# Patient Record
Sex: MALE | Race: WHITE | Employment: FULL TIME | URBAN - NONMETROPOLITAN AREA
[De-identification: names, ages, dates, MRNs, and addresses within clinical notes are randomized per-mention and may not be internally consistent; named-entity substitution may affect disease eponyms.]

---

## 2021-05-20 ENCOUNTER — HOSPITAL ENCOUNTER (OUTPATIENT)
Age: 66
Discharge: HOME | End: 2021-05-20
Payer: OTHER GOVERNMENT

## 2021-05-20 DIAGNOSIS — R31.0 GROSS HEMATURIA: Primary | ICD-10-CM

## 2021-05-20 DIAGNOSIS — R31.0 GROSS HEMATURIA: ICD-10-CM

## 2021-05-20 LAB
BILIRUBIN URINE: NEGATIVE
BLOOD, URINE: NEGATIVE
CHARACTER, URINE: CLEAR
COLOR: YELLOW
GLUCOSE, URINE: NEGATIVE MG/DL
KETONES, URINE: NEGATIVE
LEUKOCYTE EST, POC: NEGATIVE
NITRITE, URINE: NEGATIVE
PH UA: 6.5 (ref 5–9)
PROTEIN UA: NEGATIVE MG/DL
SPECIFIC GRAVITY UA: 1.02 (ref 1–1.03)
UROBILINOGEN, URINE: 1 EU/DL (ref 0–1)

## 2021-05-20 PROCEDURE — 81003 URINALYSIS AUTO W/O SCOPE: CPT

## 2021-07-06 ENCOUNTER — HOSPITAL ENCOUNTER (OUTPATIENT)
Age: 66
Discharge: HOME | End: 2021-07-06

## 2021-07-06 LAB — RUBELLA: 118.8 IU/ML

## 2021-07-09 LAB
MUMPS IGG TITER: 4.27
VZV IGG SER QL IA: 2.99

## 2021-07-10 LAB — RUBEOLA IGG: > 300 AU/ML

## 2021-09-14 ENCOUNTER — HOSPITAL ENCOUNTER (OUTPATIENT)
Age: 66
Discharge: HOME | End: 2021-09-14
Payer: OTHER GOVERNMENT

## 2021-09-14 LAB
ALBUMIN SERPL-MCNC: 4.8 G/DL (ref 3.5–5.1)
ALP BLD-CCNC: 72 U/L (ref 38–126)
ALT SERPL-CCNC: 25 U/L (ref 11–66)
ANION GAP SERPL CALCULATED.3IONS-SCNC: 10 MEQ/L (ref 8–16)
AST SERPL-CCNC: 21 U/L (ref 5–40)
BASOPHILS # BLD: 0.7 %
BASOPHILS ABSOLUTE: 0 THOU/MM3 (ref 0–0.1)
BILIRUB SERPL-MCNC: 0.5 MG/DL (ref 0.3–1.2)
BUN BLDV-MCNC: 23 MG/DL (ref 7–22)
CALCIUM SERPL-MCNC: 9.7 MG/DL (ref 8.5–10.5)
CHLORIDE BLD-SCNC: 104 MEQ/L (ref 98–111)
CHOLESTEROL, TOTAL: 167 MG/DL (ref 100–199)
CO2: 28 MEQ/L (ref 23–33)
CREAT SERPL-MCNC: 0.8 MG/DL (ref 0.4–1.2)
EOSINOPHIL # BLD: 1.5 %
EOSINOPHILS ABSOLUTE: 0.1 THOU/MM3 (ref 0–0.4)
ERYTHROCYTE [DISTWIDTH] IN BLOOD BY AUTOMATED COUNT: 13.2 % (ref 11.5–14.5)
ERYTHROCYTE [DISTWIDTH] IN BLOOD BY AUTOMATED COUNT: 45.4 FL (ref 35–45)
GFR SERPL CREATININE-BSD FRML MDRD: > 90 ML/MIN/1.73M2
GLUCOSE BLD-MCNC: 106 MG/DL (ref 70–108)
HCT VFR BLD CALC: 51.4 % (ref 42–52)
HDLC SERPL-MCNC: 57 MG/DL
HEMOGLOBIN: 17 GM/DL (ref 14–18)
IMMATURE GRANS (ABS): 0.01 THOU/MM3 (ref 0–0.07)
IMMATURE GRANULOCYTES: 0.2 %
LDL CHOLESTEROL CALCULATED: 92 MG/DL
LYMPHOCYTES # BLD: 32.7 %
LYMPHOCYTES ABSOLUTE: 1.5 THOU/MM3 (ref 1–4.8)
MCH RBC QN AUTO: 31 PG (ref 26–33)
MCHC RBC AUTO-ENTMCNC: 33.1 GM/DL (ref 32.2–35.5)
MCV RBC AUTO: 93.8 FL (ref 80–94)
MONOCYTES # BLD: 8.8 %
MONOCYTES ABSOLUTE: 0.4 THOU/MM3 (ref 0.4–1.3)
NUCLEATED RED BLOOD CELLS: 0 /100 WBC
PLATELET # BLD: 172 THOU/MM3 (ref 130–400)
PMV BLD AUTO: 10.5 FL (ref 9.4–12.4)
POTASSIUM SERPL-SCNC: 4.7 MEQ/L (ref 3.5–5.2)
PROSTATE SPECIFIC ANTIGEN: 1.48 NG/ML (ref 0–1)
RBC # BLD: 5.48 MILL/MM3 (ref 4.7–6.1)
SEG NEUTROPHILS: 56.1 %
SEGMENTED NEUTROPHILS ABSOLUTE COUNT: 2.5 THOU/MM3 (ref 1.8–7.7)
SODIUM BLD-SCNC: 142 MEQ/L (ref 135–145)
TOTAL PROTEIN: 7 G/DL (ref 6.1–8)
TRIGL SERPL-MCNC: 89 MG/DL (ref 0–199)
TSH SERPL DL<=0.05 MIU/L-ACNC: 1.63 UIU/ML (ref 0.4–4.2)
WBC # BLD: 4.5 THOU/MM3 (ref 4.8–10.8)

## 2021-09-14 PROCEDURE — 84153 ASSAY OF PSA TOTAL: CPT

## 2021-09-14 PROCEDURE — 84443 ASSAY THYROID STIM HORMONE: CPT

## 2021-09-14 PROCEDURE — 80053 COMPREHEN METABOLIC PANEL: CPT

## 2021-09-14 PROCEDURE — 80061 LIPID PANEL: CPT

## 2021-09-14 PROCEDURE — 36415 COLL VENOUS BLD VENIPUNCTURE: CPT

## 2021-09-14 PROCEDURE — 85025 COMPLETE CBC W/AUTO DIFF WBC: CPT

## 2022-11-17 ENCOUNTER — HOSPITAL ENCOUNTER (OUTPATIENT)
Age: 67
Discharge: HOME | End: 2022-11-17

## 2023-04-20 ENCOUNTER — NURSE ONLY (OUTPATIENT)
Dept: LAB | Age: 68
End: 2023-04-20

## 2023-04-20 ENCOUNTER — OFFICE VISIT (OUTPATIENT)
Dept: PULMONOLOGY | Age: 68
End: 2023-04-20
Payer: MEDICARE

## 2023-04-20 VITALS
TEMPERATURE: 98.2 F | SYSTOLIC BLOOD PRESSURE: 122 MMHG | BODY MASS INDEX: 26.81 KG/M2 | HEIGHT: 69 IN | DIASTOLIC BLOOD PRESSURE: 72 MMHG | HEART RATE: 74 BPM | OXYGEN SATURATION: 95 % | WEIGHT: 181 LBS

## 2023-04-20 DIAGNOSIS — G47.10 HYPERSOMNIA: ICD-10-CM

## 2023-04-20 DIAGNOSIS — R06.83 SNORING: ICD-10-CM

## 2023-04-20 DIAGNOSIS — F32.A DEPRESSION, UNSPECIFIED DEPRESSION TYPE: ICD-10-CM

## 2023-04-20 DIAGNOSIS — R06.81 APNEA: ICD-10-CM

## 2023-04-20 DIAGNOSIS — G47.10 HYPERSOMNIA: Primary | ICD-10-CM

## 2023-04-20 DIAGNOSIS — G47.30 SLEEP APNEA, UNSPECIFIED TYPE: ICD-10-CM

## 2023-04-20 DIAGNOSIS — R53.83 TIREDNESS: ICD-10-CM

## 2023-04-20 LAB
ANION GAP SERPL CALC-SCNC: 9 MEQ/L (ref 8–16)
BASOPHILS ABSOLUTE: 0 THOU/MM3 (ref 0–0.1)
BASOPHILS NFR BLD AUTO: 0.6 %
BUN SERPL-MCNC: 24 MG/DL (ref 7–22)
CALCIUM SERPL-MCNC: 9.6 MG/DL (ref 8.5–10.5)
CHLORIDE SERPL-SCNC: 105 MEQ/L (ref 98–111)
CO2 SERPL-SCNC: 27 MEQ/L (ref 23–33)
CREAT SERPL-MCNC: 1 MG/DL (ref 0.4–1.2)
DEPRECATED RDW RBC AUTO: 46.3 FL (ref 35–45)
EOSINOPHIL NFR BLD AUTO: 1.5 %
EOSINOPHILS ABSOLUTE: 0.1 THOU/MM3 (ref 0–0.4)
ERYTHROCYTE [DISTWIDTH] IN BLOOD BY AUTOMATED COUNT: 13.5 % (ref 11.5–14.5)
GFR SERPL CREATININE-BSD FRML MDRD: > 60 ML/MIN/1.73M2
GLUCOSE SERPL-MCNC: 107 MG/DL (ref 70–108)
HCT VFR BLD AUTO: 48.3 % (ref 42–52)
HGB BLD-MCNC: 16 GM/DL (ref 14–18)
IMM GRANULOCYTES # BLD AUTO: 0.01 THOU/MM3 (ref 0–0.07)
IMM GRANULOCYTES NFR BLD AUTO: 0.2 %
LYMPHOCYTES ABSOLUTE: 1.8 THOU/MM3 (ref 1–4.8)
LYMPHOCYTES NFR BLD AUTO: 34.3 %
MCH RBC QN AUTO: 30.8 PG (ref 26–33)
MCHC RBC AUTO-ENTMCNC: 33.1 GM/DL (ref 32.2–35.5)
MCV RBC AUTO: 92.9 FL (ref 80–94)
MONOCYTES ABSOLUTE: 0.4 THOU/MM3 (ref 0.4–1.3)
MONOCYTES NFR BLD AUTO: 8.3 %
NEUTROPHILS NFR BLD AUTO: 55.1 %
NRBC BLD AUTO-RTO: 0 /100 WBC
PLATELET # BLD AUTO: 205 THOU/MM3 (ref 130–400)
PMV BLD AUTO: 10.7 FL (ref 9.4–12.4)
POTASSIUM SERPL-SCNC: 4.4 MEQ/L (ref 3.5–5.2)
RBC # BLD AUTO: 5.2 MILL/MM3 (ref 4.7–6.1)
SEGMENTED NEUTROPHILS ABSOLUTE COUNT: 2.9 THOU/MM3 (ref 1.8–7.7)
SODIUM SERPL-SCNC: 141 MEQ/L (ref 135–145)
TSH SERPL DL<=0.005 MIU/L-ACNC: 2.02 UIU/ML (ref 0.4–4.2)
WBC # BLD AUTO: 5.3 THOU/MM3 (ref 4.8–10.8)

## 2023-04-20 PROCEDURE — G8419 CALC BMI OUT NRM PARAM NOF/U: HCPCS | Performed by: INTERNAL MEDICINE

## 2023-04-20 PROCEDURE — 1123F ACP DISCUSS/DSCN MKR DOCD: CPT | Performed by: INTERNAL MEDICINE

## 2023-04-20 PROCEDURE — G8427 DOCREV CUR MEDS BY ELIG CLIN: HCPCS | Performed by: INTERNAL MEDICINE

## 2023-04-20 PROCEDURE — 1036F TOBACCO NON-USER: CPT | Performed by: INTERNAL MEDICINE

## 2023-04-20 PROCEDURE — 99203 OFFICE O/P NEW LOW 30 MIN: CPT | Performed by: INTERNAL MEDICINE

## 2023-04-20 PROCEDURE — 3017F COLORECTAL CA SCREEN DOC REV: CPT | Performed by: INTERNAL MEDICINE

## 2023-04-20 RX ORDER — CITALOPRAM 20 MG/1
20 TABLET ORAL DAILY
COMMUNITY

## 2023-04-20 RX ORDER — ASPIRIN 81 MG/1
81 TABLET ORAL DAILY
COMMUNITY

## 2023-04-20 RX ORDER — ATORVASTATIN CALCIUM 40 MG/1
40 TABLET, FILM COATED ORAL DAILY
COMMUNITY

## 2023-04-20 RX ORDER — CLONAZEPAM 0.5 MG/1
0.5 TABLET ORAL 2 TIMES DAILY PRN
COMMUNITY

## 2023-04-20 NOTE — PROGRESS NOTES
[facility] and [address] initial consultation note    Kinjal Singletary                                                Chief complaint: Kinjal Singletary is a 74 y. o.oldmale came for further evaluation regarding his ?sleep apnea  with referral from self/ Dr. Landy Everett in [facility]. Ramona:    Sleep/Wake schedule:  Usual time to go to bed during the work/regular day of week: 9:45 PM  Usual time to wake up during the work//regular day of week: 6:15 AM  Over the weekends his sleep schedule:  [x]phase delayed. He feels the same on the weekends despite sleeping long time. He usually falls a sleep in less than: 20 minutes. He can go to sleep within 20 minutes even without taking Klonopin. He is taking Klonopin 0.25 mg p.o. nightly for sleep maintenance insomnia. He receives his prescription from his family physician. He used to take melatonin 10 mg p.o. nightly in the past for his sleep maintenance insomnia with no significant improvement. Patient quit taking melatonin. He takes naps: Yes. Number of naps per week: 2 Times, especially on the weekends  During each nap he spends a total of: 10 minutes to 60 minutes  The naps were reported as refreshing: Yes. Sleep Hygiene:  Is the temperature and evironment in his bed room is acceptable to him: Yes. He watches Television in his bed room: No.   He read books, study, pay bills etc in the bed: No.   Frequency He wake up during night/sleep: 1 time. He used to wake up up to 2 times during nighttime in the past.  He was started on Cardura. After starting on Cardura he is waking up 1 time at nighttime to go to restroom. Majority of nocturnal awakenings are for urination: Yes. Difficulty in falling back to sleep after nocturnal awakenings: No  .  Do you drink coffee: Yes. He drinks 1 to 2 cups of coffee per day. He drinks his second cup of coffee before noon.       Do you drink

## 2023-04-20 NOTE — PROGRESS NOTES
Chief Complaint: New sleep consult, self referral No priors, no echos. Mallampati airway Class:2  Neck Circumference: 17 Inches    Moore sleepiness score 4/20/23: {NUMBERS [id number]:[id number]}.

## 2023-04-20 NOTE — PATIENT INSTRUCTIONS
Recommendations/Plan:  - Schedule patient for nocturnal polysomnogram (Sleep study) with split night protocol at [facility] to diagnose sleep apnea and to get optimal pressure I.e CPAP or BiPAP to start/continue PAP therapy. Patient to follow with my clinic at [facility] sleep [facility] in 6 to 8 weeks with CPAP download for further evaluation.  -Patient CBC, BMP ,free T4 and TSH. He was advised to call my office in a.m. to go over the lab results. He was also advised to check the labs in [address]  -He was advised to use Melatonin 3 mg p.o. nightly from over-the-counter.   -He was advised to taper and discontinue Klonopin.  -I had a discussion with patient regarding avialable treatment options for his sleep disorder breathing including but not limited to CPAP titration in the sleep lab Vs.Dental appliance placement with referral to a local dentist Vs other available surgical options including Uvulopalatopharyngoplasty, maxillomandibular ostomy, Inspire device placement and tracheostomy as last option. At the end of discussion, he decided on CPAP/BiPAP/AutoSV as a treatment if he found to have obstructive sleep apnea during above test/study.  -He was educated to practice good sleep hygiene practices. He was provided with a good sleep hygiene hand out.  -Gabriela Salgado was advised to make earlier appointment with my clinic if he develops any worsening of sleep symptoms. He verbalizes understanding.  -Gabriela Salgado was advised to not to drive any motor vehicles or operate heavy equipment until his sleep symptoms are under good control. Kevin Leon verbalizes understanding.  -Kevin Leon was educated about my impression and plan. He verbalizes understanding.

## 2023-04-24 ENCOUNTER — HOSPITAL ENCOUNTER (OUTPATIENT)
Dept: SLEEP CENTER | Age: 68
Discharge: HOME | End: 2023-04-26
Payer: MEDICARE

## 2023-04-24 DIAGNOSIS — R53.83 TIREDNESS: ICD-10-CM

## 2023-04-24 DIAGNOSIS — R06.83 SNORING: ICD-10-CM

## 2023-04-24 DIAGNOSIS — G47.30 SLEEP APNEA, UNSPECIFIED TYPE: ICD-10-CM

## 2023-04-24 DIAGNOSIS — R06.81 APNEA: ICD-10-CM

## 2023-04-24 DIAGNOSIS — F32.A DEPRESSION, UNSPECIFIED DEPRESSION TYPE: ICD-10-CM

## 2023-04-24 DIAGNOSIS — G47.10 HYPERSOMNIA: ICD-10-CM

## 2023-04-24 PROCEDURE — 95810 POLYSOM 6/> YRS 4/> PARAM: CPT

## 2023-04-25 DIAGNOSIS — F32.A DEPRESSION, UNSPECIFIED DEPRESSION TYPE: ICD-10-CM

## 2023-04-25 DIAGNOSIS — G47.10 HYPERSOMNIA: Primary | ICD-10-CM

## 2023-04-25 DIAGNOSIS — G47.33 OSA (OBSTRUCTIVE SLEEP APNEA): ICD-10-CM

## 2023-04-25 DIAGNOSIS — R53.83 TIREDNESS: ICD-10-CM

## 2023-04-25 LAB — STATUS: NORMAL

## 2023-04-26 NOTE — PROGRESS NOTES
[address] [place] [address]                               SLEEP STUDY REPORT    PATIENT NAME: Vivek Desouza                     :        1955  MED REC NO:   [id number]                           ROOM:  ACCOUNT NO:   [de-identified]                           ADMIT DATE: 2023  PROVIDER:     Chela Guerrero. MD Salome    DATE OF STUDY:  2023    REFERRING PROVIDER:  Jose Maria Arango MD    The patient's height is 69 inches, weight is 181 pounds with a BMI of  26.7. HISTORY:  The patient is a 44-year-old pleasant gentleman who was  initially evaluated by me on 2023. The patient is currently  suffering with hypersomnia. The patient also gave a history of snoring  and witnessed apneas. The patient having frequent nocturnal awakenings. The patient had associated comorbidities including depression. The  patient underwent tonsillectomy in the past.  The patient is scheduled  for split-night sleep study to diagnose and treat sleep apnea. The  patient however did not qualify for split-night sleep study and  underwent only baseline sleep study due to insufficient respiratory  events in the first 2 hours of the sleep study to qualify patient for  split-night sleep study. METHODS:  The patient underwent digital polysomnography in compliance  with the standards and specifications from the AASM Manual including the  simultaneous recording of 3 EEG channels (F4-M1, C4-M1, and O2-M1 with  back up electrodes F3-M2, C3-M2, and O1-M2), 2 EOG channels (E1-M2, and  E2-M1,), EMG (chin, left & right leg), EKG, Nonin pulse oximetry with   less than 2 second averaging time, body position, airflow recorded by  oral-nasal thermal sensor and nasal air pressure transducer, plus  respiratory effort recorded by calibrated respiratory inductance  plethysmography (RIP), flow volume loop, sound and video.   Sleep staging  and scoring

## 2023-04-27 ENCOUNTER — TELEPHONE (OUTPATIENT)
Dept: PULMONOLOGY | Age: 68
End: 2023-04-27

## 2023-04-27 NOTE — TELEPHONE ENCOUNTER
Called Pt to verify which DME he wanted.  Sent CPAP order to [facility] per Pts request.

## 2023-04-27 NOTE — TELEPHONE ENCOUNTER
----- Message from Corey Felton MD sent at 4/27/2023  9:38 AM EDT -----  Regarding: Need to fax the order to DME  -Will start patient on Auto CPAP machine therapy with a Minimum CPAP of 6cm H20 and Maximum CPAP of 20cm H20. He need to be followed closely with down load by via680 company in 4 weeks. He was instructed to make earlier appointment with my sleep clinic if he had any difficulty in using his auto CPAP machine therapy to consider for in lab CPAP titration.   -He need to come for follow up with my clinic in 2months to 10 weeks with a CPAP down load for clinical reevaluation and management.     Thanks

## 2023-04-28 NOTE — TELEPHONE ENCOUNTER
Hussain from [facility] called and states that [facility] is out of their service area so they are unable to process the order for a rental machine. She states that he will need to choose a DME that is closer to where he lives in [facility]. She is uncertain on what DME companies are available to him. Called patient and LVM explaining the situation. Advised patient via voicemail to call office back once he has chosen a DME company so that we may fax the order.

## 2023-05-04 ENCOUNTER — TELEPHONE (OUTPATIENT)
Dept: PULMONOLOGY | Age: 68
End: 2023-05-04

## 2023-07-19 ENCOUNTER — OFFICE VISIT (OUTPATIENT)
Dept: PULMONOLOGY | Age: 68
End: 2023-07-19
Payer: MEDICARE

## 2023-07-19 VITALS
HEART RATE: 69 BPM | TEMPERATURE: 97.7 F | BODY MASS INDEX: 27.25 KG/M2 | DIASTOLIC BLOOD PRESSURE: 70 MMHG | OXYGEN SATURATION: 98 % | WEIGHT: 184 LBS | HEIGHT: 69 IN | SYSTOLIC BLOOD PRESSURE: 118 MMHG

## 2023-07-19 DIAGNOSIS — G47.33 OSA ON CPAP: Primary | ICD-10-CM

## 2023-07-19 DIAGNOSIS — Z99.89 OSA ON CPAP: Primary | ICD-10-CM

## 2023-07-19 DIAGNOSIS — G47.10 HYPERSOMNIA: ICD-10-CM

## 2023-07-19 PROCEDURE — 3017F COLORECTAL CA SCREEN DOC REV: CPT | Performed by: INTERNAL MEDICINE

## 2023-07-19 PROCEDURE — 99213 OFFICE O/P EST LOW 20 MIN: CPT | Performed by: INTERNAL MEDICINE

## 2023-07-19 PROCEDURE — 1036F TOBACCO NON-USER: CPT | Performed by: INTERNAL MEDICINE

## 2023-07-19 PROCEDURE — G8427 DOCREV CUR MEDS BY ELIG CLIN: HCPCS | Performed by: INTERNAL MEDICINE

## 2023-07-19 PROCEDURE — 1123F ACP DISCUSS/DSCN MKR DOCD: CPT | Performed by: INTERNAL MEDICINE

## 2023-07-19 PROCEDURE — G8417 CALC BMI ABV UP PARAM F/U: HCPCS | Performed by: INTERNAL MEDICINE

## 2023-07-19 NOTE — PROGRESS NOTES
Chief Complaint: f/u after titration with download     Mallampati airway Class:3  Neck Circumference:.17 Inches    [place] sleepiness score 7/19/23: 6  Sleep apnea quality of life questionnaire:.50      Diagnostic Data:   PAP Download:   Recorded compliance dates:6/18/23-7/17/23  More than 4hour usage compliance was:33%. Average residual Apnea- Hypoapnea index on current pressue was:1.3.       PAP Type auto   Level  6/20     Average usuage hours per day was:.[id number]     Interface: nasal    Provider:  []-[facility]IVLA  [x]Anjel  []Tomas  []Melissaare         [][facility] []Other:

## 2023-07-19 NOTE — PATIENT INSTRUCTIONS
Recommendations/Plan:  -He was advised to submit his down load report from hisCPAP for next 1month starting from today to document his > 4hour compliance. He was informed about the possibility of loosing his CPAP if he don't use it with good compliance for >4hours i.e >70%. He verbalizes understanding.  -We will change his current auto CPAP pressure settings to a minimal CPAP pressure of 8 cm of water and max CPAP pressure of 12 cm of water and an EPR of 2.  -He will be referred to [facility]( [address] [place] [address],[facility]) company for mask refit with a different style mask for better compliance and comfort. He is interested in trying nasal pillows mask.  -Patient will be given a prescription for chin strap to use with his current mask to avoid leaks and to improve his dryness of mouth.  -He want to taper and discontinue Celexa. He told me that his depression is under good control.  -He is continuing Klonopin 0.25 mg p.o. nightly on as needed basis. -He was advised to take Klonopin only if he can keep good compliance to the CPAP therapy.  -He was also informed about the non-availability of safety data regarding simultaneous hypnotic therapy in patients with obstructive sleep apnea. He Verbalizes understanding. He agreed to go for therapy with hypnotic by taking the risks. He also informed about the need to for 100% compliance with CPAP therapy during above medication use. -Hewas detailed about the side effects of long term use of hypnotics including Klonopin. He agreed to take the risk. Hewas also advised to take Klonopin on PRN basis only with intermittent dosing. He was advised to not to drive any motor vehicles or operate heavy equipment after taking the above medication.  He verbalizes understanding.  -He was advised to make early appointment with my clinic if he develops any of the following conditions including -> COPD, CHF,Obesity hypoventilation syndrome, undergo palate surgery and Central sleep apnea his Auto

## 2023-09-18 NOTE — PROGRESS NOTES
[place] [facility] and [address] [facility] Follow up note    Acquanetta Officer                                             Chief complaint and [place]: Acquanetta Officer is a 79 y. o.oldmale came for follow up regarding his sleep apnea. He is currently using his positive airway pressure device with an Auto CPAP machine therapy with a Minimum CPAP of 8cm H20 and Maximum CPAP of 12cm H20. He denies any problems with machine. He is currently using a nasal pillow  mask. His CPAP machine is in his bedroom in [place], [place]. 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 He is tolerating his CPAP machine well. His residual apnea hypopneas is under control. He denies any excessive daytime sleepiness. 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 Review of Systems:   General/Constitutional: he had same weight from the last visit with normal appetite. No fever or chills. HENT: Negative. Eyes: Negative. Upper respiratory tract: No nasal stuffiness or post nasal drip. Lower respiratory tract/ lungs: No cough or sputum production. No hemoptysis. Cardiovascular: No palpitations or chest pain. Gastrointestinal: No nausea or vomiting. Neurological: No focal neurologiacal weakness. Extremities: No edema. Musculoskeletal: No complaints. Genitourinary: No complaints. Hematological: Negative. Psychiatric/Behavioral: Negative. Skin: No itching. No past medical history on file. No past surgical history on file.     Social History     Tobacco Use    Smoking status: Never     Passive exposure: Never    Smokeless tobacco: Never   Substance Use Topics    Alcohol use: Not Currently    Drug use: Never       No Known Allergies    Current Outpatient Medications   Medication Sig Dispense Refill

## 2023-10-18 ENCOUNTER — OFFICE VISIT (OUTPATIENT)
Dept: PULMONOLOGY | Age: 68
End: 2023-10-18
Payer: MEDICARE

## 2023-10-18 VITALS
OXYGEN SATURATION: 98 % | TEMPERATURE: 97.6 F | WEIGHT: 184.2 LBS | BODY MASS INDEX: 27.28 KG/M2 | SYSTOLIC BLOOD PRESSURE: 118 MMHG | DIASTOLIC BLOOD PRESSURE: 66 MMHG | HEART RATE: 78 BPM | HEIGHT: 69 IN

## 2023-10-18 DIAGNOSIS — G47.33 OSA ON CPAP: Primary | ICD-10-CM

## 2023-10-18 PROCEDURE — G8484 FLU IMMUNIZE NO ADMIN: HCPCS | Performed by: INTERNAL MEDICINE

## 2023-10-18 PROCEDURE — 99213 OFFICE O/P EST LOW 20 MIN: CPT | Performed by: INTERNAL MEDICINE

## 2023-10-18 PROCEDURE — 1036F TOBACCO NON-USER: CPT | Performed by: INTERNAL MEDICINE

## 2023-10-18 PROCEDURE — 1123F ACP DISCUSS/DSCN MKR DOCD: CPT | Performed by: INTERNAL MEDICINE

## 2023-10-18 PROCEDURE — 3017F COLORECTAL CA SCREEN DOC REV: CPT | Performed by: INTERNAL MEDICINE

## 2023-10-18 PROCEDURE — G8417 CALC BMI ABV UP PARAM F/U: HCPCS | Performed by: INTERNAL MEDICINE

## 2023-10-18 PROCEDURE — G8427 DOCREV CUR MEDS BY ELIG CLIN: HCPCS | Performed by: INTERNAL MEDICINE

## 2023-10-18 NOTE — PROGRESS NOTES
Chief Complaint: Glory Perry is a 3 month f/u with download    Mallampati airway Class:3  Neck Circumference:. 17Inches    [place]ock sleepiness score 10/18/23: 5  Sleep apnea quality of life questionnaire:81      Diagnostic Data:   PAP Download:   Recorded compliance dates:9/17/23-10/16/23  More than 4hour usage compliance was:67%. Average residual Apnea- Hypoapnea index on current pressue was:. 1.3      PAP Type  auto   Level  8/12     Average usuage hours per day was:.[id number]     Interface: nasal pillows    Provider:  [][facility]  [x][employer]  [][employer]  [][facility]         [][facility] []Other:

## 2023-10-18 NOTE — PATIENT INSTRUCTIONS
Recommendations/Plan:  -He was advised to submit his down load report from hisAP for next 1month starting from today to document his > 4hour compliance. He was informed about the possibility of loosing his CPAP if he don't use it with good compliance for >4hours i.e >70%. He verbalizes understanding.  -He is continuing Klonopin 0.25 mg p.o. nightly on as needed basis. -He was advised to make early appointment with my clinic if he develops any of the following conditions including -> COPD, CHF,Obesity hypoventilation syndrome, undergo palate surgery and Central sleep apnea his Auto CPAP settings to a fixed CPAP pressure settings. He verbalizes understanding.  -He was instructed to extend his sleep schedule to 7 to 9 hours in a given 24hour period continuously.  -He need to come for follow up with my clinic in 12months with a CPAP down load for clinical reevaluation and management.  -he advised to keep good compliance with recommended positive airway pressure therapy to get optimal results and clinical improvement.  -He was advised to call [employer] regarding supplies if needed. -He was advised to call my office for earlier appointment if needed for worsening of sleep symptoms.  -He was advised to continue to practice good sleep hygiene practices.  -He was advised to loose weight by controlling diet and doing exercise. -Jorge Templeton was educated about my impression and plan. He verbalizes understanding.

## 2024-09-18 NOTE — PROGRESS NOTES
[facility]  [facility] Medicine Clinic Follow up note    Hang Neff                                             Chief complaint and [place]: Hang Neff is a 69 y.o.oldmale came for follow up regarding his sleep apnea. He is currently using his positive airway pressure device with an Auto CPAP machine therapy with a Minimum CPAP of 8cm H20 and Maximum CPAP of 12cm H20. He denies any problems with machine.  He is currently using a nasal pillow  mask.    His CPAP machine is in his bedroom in [place], [place].  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    He is tolerating his CPAP machine well.  His residual apnea hypopneas is under control.  He denies any excessive daytime sleepiness.    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      Review of Systems:   General/Constitutional: he lost 10lbs of weight from the last visit. No fever or chills.  HENT: Negative.   Eyes: Negative.  Upper respiratory tract: No nasal stuffiness or post nasal drip.  Lower respiratory tract/ lungs: No cough or sputum production.  Cardiovascular: No palpitations or chest pain.  Gastrointestinal: No nausea or vomiting.  Neurological: No focal neurologiacal weakness.  Extremities: No edema.  Musculoskeletal: No complaints.  Genitourinary: No complaints.  Hematological: Negative.   Psychiatric/Behavioral: Negative.   Skin: No itching.      No past medical history on file.    No past surgical history on file.    Social History     Tobacco Use    Smoking status: Never     Passive exposure: Never    Smokeless tobacco: Never   Substance Use Topics    Alcohol use: Not Currently    Drug use: Never       No Known Allergies    Current Outpatient Medications   Medication Sig Dispense Refill    CPAP Machine MISC by Does not apply route Please change his current auto CPAP pressure settings

## 2024-10-15 NOTE — PROGRESS NOTES
Chief Complaint:  1yr  MONICA f/u w/download    Mallampati airway Class:III  Neck Circumference:17.0 Inches    Harrison sleepiness score 10/15/24: 3.  SAQLI:  98    Diagnostic Data:   PAP Download:   Recorded compliance dates:  9/15/24-10/14/24  More than 4hour usage compliance was:87%.  Average residual Apnea- Hypoapnea index on current pressue was:0.7.      PAP Type APAP   Level  8/12 cmH2O     Average usuage hours per day was:6.46     Interface: Nasal Pillow    Provider:  [][facility]  [x][facility]  [][employer]  [][facility]         [][facility] []Other:

## 2024-10-16 ENCOUNTER — OFFICE VISIT (OUTPATIENT)
Dept: PULMONOLOGY | Age: 69
End: 2024-10-16
Payer: MEDICARE

## 2024-10-16 VITALS
SYSTOLIC BLOOD PRESSURE: 118 MMHG | WEIGHT: 174.6 LBS | BODY MASS INDEX: 25.86 KG/M2 | DIASTOLIC BLOOD PRESSURE: 72 MMHG | HEIGHT: 69 IN | TEMPERATURE: 97.7 F | OXYGEN SATURATION: 99 % | HEART RATE: 68 BPM

## 2024-10-16 DIAGNOSIS — G47.33 OSA ON CPAP: Primary | ICD-10-CM

## 2024-10-16 PROCEDURE — G8417 CALC BMI ABV UP PARAM F/U: HCPCS | Performed by: INTERNAL MEDICINE

## 2024-10-16 PROCEDURE — 1123F ACP DISCUSS/DSCN MKR DOCD: CPT | Performed by: INTERNAL MEDICINE

## 2024-10-16 PROCEDURE — 3017F COLORECTAL CA SCREEN DOC REV: CPT | Performed by: INTERNAL MEDICINE

## 2024-10-16 PROCEDURE — G8484 FLU IMMUNIZE NO ADMIN: HCPCS | Performed by: INTERNAL MEDICINE

## 2024-10-16 PROCEDURE — G8427 DOCREV CUR MEDS BY ELIG CLIN: HCPCS | Performed by: INTERNAL MEDICINE

## 2024-10-16 PROCEDURE — 99213 OFFICE O/P EST LOW 20 MIN: CPT | Performed by: INTERNAL MEDICINE

## 2024-10-16 PROCEDURE — 1036F TOBACCO NON-USER: CPT | Performed by: INTERNAL MEDICINE

## 2024-10-16 NOTE — PATIENT INSTRUCTIONS
Recommendations/Plan:  -He is continuing Klonopin 0.25 mg p.o. nightly on as needed basis.  He gets his Klonopin prescription from his family physician.  -He was educated and advised to apply his current mask properly and tight enough to minimize leaks.  -He was advised to make early appointment with my clinic if he develops any of the following conditions including -> COPD, CHF,Obesity hypoventilation syndrome, undergo palate surgery and Central sleep apnea his Auto CPAP settings to a fixed CPAP pressure settings. He verbalizes understanding.  -He was also informed about the non-availability of safety data regarding simultaneous hypnotic therapy in patients with obstructive sleep apnea. He  Verbalizes understanding. He agreed to go for therapy with hypnotic by taking the risks. He also informed about the need to for 100% compliance with CPAP/BiPAP therapy during above medication use.  -He was instructed to extend his sleep schedule to 7 to 9 hours in a given 24hour period continuously.  -he advised to keep good compliance with recommended positive airway pressure therapy to get optimal results and clinical improvement.  -He was advised to call [employer] regarding supplies if needed.  -He was advised to call my office for earlier appointment if needed for worsening of sleep symptoms.  -He was advised to continue to practice good sleep hygiene practices.  -He need to come for follow up with my clinic in 12months with a CPAP down load for clinical reevaluation and management.  -Hang Neff was educated about my impression and plan. He verbalizes understanding.